# Patient Record
Sex: FEMALE | Race: AMERICAN INDIAN OR ALASKA NATIVE
[De-identification: names, ages, dates, MRNs, and addresses within clinical notes are randomized per-mention and may not be internally consistent; named-entity substitution may affect disease eponyms.]

---

## 2018-12-27 ENCOUNTER — HOSPITAL ENCOUNTER (EMERGENCY)
Dept: HOSPITAL 5 - ED | Age: 33
Discharge: HOME | End: 2018-12-27
Payer: COMMERCIAL

## 2018-12-27 VITALS — SYSTOLIC BLOOD PRESSURE: 148 MMHG | DIASTOLIC BLOOD PRESSURE: 90 MMHG

## 2018-12-27 DIAGNOSIS — R42: Primary | ICD-10-CM

## 2018-12-27 DIAGNOSIS — R51: ICD-10-CM

## 2018-12-27 LAB
BASOPHILS # (AUTO): 0.1 K/MM3 (ref 0–0.1)
BASOPHILS NFR BLD AUTO: 1.1 % (ref 0–1.8)
BILIRUB UR QL STRIP: (no result)
BLOOD UR QL VISUAL: (no result)
BUN SERPL-MCNC: 6 MG/DL (ref 7–17)
BUN/CREAT SERPL: 9 %
CALCIUM SERPL-MCNC: 9 MG/DL (ref 8.4–10.2)
EOSINOPHIL # BLD AUTO: 0 K/MM3 (ref 0–0.4)
EOSINOPHIL NFR BLD AUTO: 0.4 % (ref 0–4.3)
HCT VFR BLD CALC: 40.4 % (ref 30.3–42.9)
HEMOLYSIS INDEX: 8
HGB BLD-MCNC: 13.3 GM/DL (ref 10.1–14.3)
LYMPHOCYTES # BLD AUTO: 1.6 K/MM3 (ref 1.2–5.4)
LYMPHOCYTES NFR BLD AUTO: 22 % (ref 13.4–35)
MCH RBC QN AUTO: 30 PG (ref 28–32)
MCHC RBC AUTO-ENTMCNC: 33 % (ref 30–34)
MCV RBC AUTO: 90 FL (ref 79–97)
MONOCYTES # (AUTO): 0.5 K/MM3 (ref 0–0.8)
MONOCYTES % (AUTO): 7.3 % (ref 0–7.3)
PH UR STRIP: 7 [PH] (ref 5–7)
PLATELET # BLD: 446 K/MM3 (ref 140–440)
PROT UR STRIP-MCNC: (no result) MG/DL
RBC # BLD AUTO: 4.47 M/MM3 (ref 3.65–5.03)
RBC #/AREA URNS HPF: < 1 /HPF (ref 0–6)
UROBILINOGEN UR-MCNC: < 2 MG/DL (ref ?–2)
WBC #/AREA URNS HPF: 1 /HPF (ref 0–6)

## 2018-12-27 PROCEDURE — 84703 CHORIONIC GONADOTROPIN ASSAY: CPT

## 2018-12-27 PROCEDURE — 96360 HYDRATION IV INFUSION INIT: CPT

## 2018-12-27 PROCEDURE — 99284 EMERGENCY DEPT VISIT MOD MDM: CPT

## 2018-12-27 PROCEDURE — 80048 BASIC METABOLIC PNL TOTAL CA: CPT

## 2018-12-27 PROCEDURE — 36415 COLL VENOUS BLD VENIPUNCTURE: CPT

## 2018-12-27 PROCEDURE — 85025 COMPLETE CBC W/AUTO DIFF WBC: CPT

## 2018-12-27 PROCEDURE — 81001 URINALYSIS AUTO W/SCOPE: CPT

## 2018-12-27 PROCEDURE — 70450 CT HEAD/BRAIN W/O DYE: CPT

## 2018-12-27 NOTE — EMERGENCY DEPARTMENT REPORT
ED Dizziness HPI





- General


Chief Complaint: Dizziness


Stated Complaint: DIZZY


Time Seen by Provider: 12/27/18 12:49


Source: patient


Mode of arrival: Ambulatory


Limitations: No Limitations





- History of Present Illness


Initial Comments: 





This is a 33-year-old female nontoxic, well nourished in appearance, no acute 

signs of distress presents to the ED with c/o of dizziness, bilateral ear 

pressure, and acute headache x1 month.  Patient describes headache as diffuse 

with level of 8 out of 10.  Patient denies thunderclap headache.  Patient denies

any radiation of pain.  Patient denies any head trauma.  Patient denies any 

visual changes.  Patient stated that this is the worse headache.  Patient stated

that darkness makes headache better and bright lights make the headache worse. 

Patient denies any syncope. Patient denies any numbness, tingling, fever, 

chills, nausea, vomiting, chest pain, shortness of breath, stiff neck.  Patient 

denies any radiation of pain.  Patient denies any allergies or PMH.


MD Complaint: dizziness, lightheadedness, other (headache)


-: month(s) (1)


Timing: gradual onset


Description: lightheadedness


History of Same: Yes


History of Trauma: No


Severity: mild


Improves With: other (darkness)


Worsens With: other (bright lights)


Associated Symptoms: denies other symptoms.  denies: ataxia, chest pain, 

confusion, cough, diaphoresis, fever/chills, loss of appetite, malaise, rash, 

seizure, shortness of breath, syncope, weakness





- Related Data


                                  Previous Rx's











 Medication  Instructions  Recorded  Last Taken  Type


 


HYDROcodone/APAP 5-325 [[place] 1 each PO Q6HR PRN #14 tablet 09/12/14 Unknown Rx





5/325]    


 


Butalb/Acetamin/Caff -40 1 tab PO Q6HR PRN #12 tab 12/27/18 Unknown Rx





[Fioricet]    











                                    Allergies











Allergy/AdvReac Type Severity Reaction Status Date / Time


 


No Known Allergies Allergy   Verified 01/01/14 04:07














ED Review of Systems


ROS: 


Stated complaint: DIZZY


Other details as noted in HPI





Constitutional: denies: chills, fever


Eyes: denies: eye pain, eye discharge, vision change


ENT: ear pain.  denies: throat pain


Respiratory: denies: cough, shortness of breath, wheezing


Cardiovascular: denies: chest pain, palpitations


Endocrine: no symptoms reported


Gastrointestinal: denies: abdominal pain, nausea, diarrhea


Genitourinary: denies: urgency, dysuria, discharge


Musculoskeletal: denies: back pain, joint swelling, arthralgia


Skin: denies: rash, lesions


Neurological: headache, other (dizziness).  denies: weakness, paresthesias


Psychiatric: denies: anxiety, depression


Hematological/Lymphatic: denies: easy bleeding, easy bruising





ED Past Medical Hx





- Past Medical History


Previous Medical History?: No





- Surgical History


Past Surgical History?: No





- Social History


Smoking Status: Never Smoker


Substance Use Type: None





- Medications


Home Medications: 


                                Home Medications











 Medication  Instructions  Recorded  Confirmed  Last Taken  Type


 


HYDROcodone/APAP 5-325 [[place] 1 each PO Q6HR PRN #14 tablet 09/12/14  Unknown Rx





5/325]     


 


Butalb/Acetamin/Caff -40 1 tab PO Q6HR PRN #12 tab 12/27/18  Unknown Rx





[Fioricet]     














ED Physical Exam





- General


Limitations: No Limitations


General appearance: alert, in no apparent distress





- Head


Head exam: Present: atraumatic, normocephalic





- Eye


Eye exam: Present: normal appearance, PERRL, EOMI


Pupils: Present: normal accommodation





- ENT


ENT exam: Present: normal exam, normal orophraynx, TM's normal bilaterally, 

normal external ear exam





- Neck


Neck exam: Present: normal inspection, full ROM.  Absent: tenderness, 

meningismus, lymphadenopathy





- Respiratory


Respiratory exam: Present: normal lung sounds bilaterally.  Absent: respiratory 

distress, wheezes, rales, rhonchi, stridor, chest wall tenderness, accessory 

muscle use, decreased breath sounds, prolonged expiratory





- Cardiovascular


Cardiovascular Exam: Present: regular rate, normal rhythm, normal heart sounds. 

 Absent: bradycardia, tachycardia, irregular rhythm, systolic murmur, diastolic 

murmur, rubs, gallop





- Extremities Exam


Extremities exam: Present: normal inspection, full ROM





- Back Exam


Back exam: Present: normal inspection, full ROM.  Absent: tenderness, paraspinal

 tenderness, vertebral tenderness





- Neurological Exam


Neurological exam: Present: alert, oriented X3, normal gait





- Expanded Neurological Exam


  ** Expanded


Patient oriented to: Present: person, place, time


Cranial nerves: EOM's Intact: Normal, Facial Sensation: Normal


Cerebellar function: Finger to Nose: Normal


Upper motor neuron: Sensory Extinction: Normal


Sensory exam: Upper Extremity Light Touch: Normal, Upper Extremity Pin Prick: 

Normal, Upper Extremity Temperature: Normal, UE 2 Point Discrimination: Normal, 

Lower Extremity Light Touch: Normal, Lower Extremity Pin Prick: Normal, Lower E

xtremity Temperature: Normal, LE 2 Point Discrimination: Normal


Motor strength exam: RUE: 5, LUE: 5, RLE: 5, LLE: 5


Best Eye Response ([place]): (4) open spontaneously


Best Motor Response ([place]): (6) obeys commands


Best Verbal Response ([place]): (5) oriented


Vera Total: 15





- Psychiatric


Psychiatric exam: Present: normal affect, normal mood





- Skin


Skin exam: Present: warm, dry, intact, normal color.  Absent: rash





ED Course





                                   Vital Signs











  12/27/18





  11:45


 


Temperature 98.9 F


 


Pulse Rate 85


 


Respiratory 18





Rate 


 


Blood Pressure 148/90


 


O2 Sat by Pulse 99





Oximetry 














- Reevaluation(s)


Reevaluation #1: 





12/27/18 14:40


Patient is speaking in full sentences with no signs of distress noted.





ED Medical Decision Making





- Lab Data


Result diagrams: 


                                 12/27/18 13:05





                                 12/27/18 13:05





- Medical Decision Making





This is a 33-year-old female that presents with headache and dizziness.  Patient

 is stable and was examined by me.  Patient is neurologically stable.  There is 

no stiff neck or neck pain.  Vital signs are stable.  Patient is afebrile.  Labs

 unremarkable.  CT of head obtained and dictated by the radiologist 

unremarkable.  Patient is notified of the CT results with no questions noted by 

the patient.  Patient received 1 L of normal saline which the patient stated 

that headache and dizziness has subsided and resolved.  Orthostatic vitals 

unremarkable.  Patient is discharged with Fioricet.  Patient was referred to 

Follow-up with a primary care/neurologist doctor in 3-5 days or if symptoms 

worsen and continue return to emergency room as soon as possible.  At time of 

discharge, the patient does not seem toxic or ill in appearance.  No acute signs

 of distress noted.  Patient agrees to discharge treatment plan of care.  No 

further questions noted by the patient.


Critical care attestation.: 


If time is entered above; I have spent that time in minutes in the direct care 

of this critically ill patient, excluding procedure time.








ED Disposition


Clinical Impression: 


 Dizziness





Headache


Qualifiers:


 Headache type: unspecified Headache chronicity pattern: acute headache 

Intractability: not intractable Qualified Code(s): R51 - Headache





Disposition: DC-01 TO HOME OR SELFCARE


Is pt being admited?: No


Does the pt Need Aspirin: No


Condition: Stable


Instructions:  Dizziness (ED), Acute Headache (ED)


Additional Instructions: 


Follow-up with a primary care/neurologist doctor in 3-5 days or if symptoms 

worsen and continue return to emergency room as soon as possible. 


Prescriptions: 


Butalb/Acetamin/Caff -40 [Fioricet] 1 tab PO Q6HR PRN #12 tab


 PRN Reason: Headache


Referrals: 


PRIMARY CARE,MD [Primary Care Provider] - 3-5 Days


SHANT CHAVEZ MD [Staff Physician] - 3-5 Days


POLA VANESSA MD [Staff Physician] - 3-5 Days


[facility] [Outside] - 3-5 Days


[facility] [Outside] - 3-5 Days


Forms:  Work/School Release Form(ED)

## 2018-12-27 NOTE — CAT SCAN REPORT
CT HEAD WITHOUT CONTRAST:



HISTORY:  Headache.



TECHNIQUE:  Sequential 2.5mm CT images.



COMPARISON: none.



FINDINGS:



Cerebral Parenchyma: Within normal limits.



Cerebellum:  Within normal limits.



Brainstem:  Within normal limits.



Ventricles: Normal.



Sella:  Normal.



Extra-axial spaces:  Normal.



Basal Cisterns:  Normal.



Intracranial Hemorrhage:  None.



Midline Shift:  None.



Calvarium: Normal.



Sinuses: Normal.



Mastoid Air Cells:  Normal.



Visualized Orbits:  Normal.







IMPRESSION:

Cranial CT scan within normal limits.

## 2020-02-26 ENCOUNTER — HOSPITAL ENCOUNTER (OUTPATIENT)
Dept: HOSPITAL 5 - ED | Age: 35
Setting detail: OBSERVATION
LOS: 1 days | Discharge: HOME | End: 2020-02-27
Attending: OBSTETRICS & GYNECOLOGY | Admitting: OBSTETRICS & GYNECOLOGY
Payer: COMMERCIAL

## 2020-02-26 DIAGNOSIS — R10.30: ICD-10-CM

## 2020-02-26 DIAGNOSIS — K52.9: Primary | ICD-10-CM

## 2020-02-26 DIAGNOSIS — N73.9: ICD-10-CM

## 2020-02-26 LAB
ALBUMIN SERPL-MCNC: 3.8 G/DL (ref 3.9–5)
ALT SERPL-CCNC: 21 UNITS/L (ref 7–56)
BASOPHILS # (AUTO): 0 K/MM3 (ref 0–0.1)
BASOPHILS NFR BLD AUTO: 0.5 % (ref 0–1.8)
BENZODIAZEPINES SCREEN,URINE: (no result)
BILIRUB UR QL STRIP: (no result)
BLOOD UR QL VISUAL: (no result)
BUN SERPL-MCNC: 10 MG/DL (ref 7–17)
BUN/CREAT SERPL: 17 %
CALCIUM SERPL-MCNC: 8.8 MG/DL (ref 8.4–10.2)
EOSINOPHIL # BLD AUTO: 0 K/MM3 (ref 0–0.4)
EOSINOPHIL NFR BLD AUTO: 0.5 % (ref 0–4.3)
HCT VFR BLD CALC: 39 % (ref 30.3–42.9)
HEMOLYSIS INDEX: 6
HGB BLD-MCNC: 13 GM/DL (ref 10.1–14.3)
LYMPHOCYTES # BLD AUTO: 1.3 K/MM3 (ref 1.2–5.4)
LYMPHOCYTES NFR BLD AUTO: 27 % (ref 13.4–35)
MCHC RBC AUTO-ENTMCNC: 33 % (ref 30–34)
MCV RBC AUTO: 90 FL (ref 79–97)
METHADONE SCREEN,URINE: (no result)
MONOCYTES # (AUTO): 0.4 K/MM3 (ref 0–0.8)
MONOCYTES % (AUTO): 7.6 % (ref 0–7.3)
MUCOUS THREADS #/AREA URNS HPF: (no result) /HPF
OPIATE SCREEN,URINE: (no result)
PH UR STRIP: 8 [PH] (ref 5–7)
PLATELET # BLD: 326 K/MM3 (ref 140–440)
PROT UR STRIP-MCNC: (no result) MG/DL
RBC # BLD AUTO: 4.35 M/MM3 (ref 3.65–5.03)
RBC #/AREA URNS HPF: 3 /HPF (ref 0–6)
UROBILINOGEN UR-MCNC: < 2 MG/DL (ref ?–2)
WBC #/AREA URNS HPF: 1 /HPF (ref 0–6)

## 2020-02-26 PROCEDURE — 82550 ASSAY OF CK (CPK): CPT

## 2020-02-26 PROCEDURE — 87210 SMEAR WET MOUNT SALINE/INK: CPT

## 2020-02-26 PROCEDURE — 81001 URINALYSIS AUTO W/SCOPE: CPT

## 2020-02-26 PROCEDURE — 74177 CT ABD & PELVIS W/CONTRAST: CPT

## 2020-02-26 PROCEDURE — 82140 ASSAY OF AMMONIA: CPT

## 2020-02-26 PROCEDURE — 76830 TRANSVAGINAL US NON-OB: CPT

## 2020-02-26 PROCEDURE — 87040 BLOOD CULTURE FOR BACTERIA: CPT

## 2020-02-26 PROCEDURE — 93975 VASCULAR STUDY: CPT

## 2020-02-26 PROCEDURE — 93010 ELECTROCARDIOGRAM REPORT: CPT

## 2020-02-26 PROCEDURE — 87591 N.GONORRHOEAE DNA AMP PROB: CPT

## 2020-02-26 PROCEDURE — 96375 TX/PRO/DX INJ NEW DRUG ADDON: CPT

## 2020-02-26 PROCEDURE — 80307 DRUG TEST PRSMV CHEM ANLYZR: CPT

## 2020-02-26 PROCEDURE — 83735 ASSAY OF MAGNESIUM: CPT

## 2020-02-26 PROCEDURE — 80053 COMPREHEN METABOLIC PANEL: CPT

## 2020-02-26 PROCEDURE — 85025 COMPLETE CBC W/AUTO DIFF WBC: CPT

## 2020-02-26 PROCEDURE — 83690 ASSAY OF LIPASE: CPT

## 2020-02-26 PROCEDURE — 84703 CHORIONIC GONADOTROPIN ASSAY: CPT

## 2020-02-26 PROCEDURE — G0378 HOSPITAL OBSERVATION PER HR: HCPCS

## 2020-02-26 PROCEDURE — 99285 EMERGENCY DEPT VISIT HI MDM: CPT

## 2020-02-26 PROCEDURE — 36415 COLL VENOUS BLD VENIPUNCTURE: CPT

## 2020-02-26 PROCEDURE — 96365 THER/PROPH/DIAG IV INF INIT: CPT

## 2020-02-26 PROCEDURE — 96367 TX/PROPH/DG ADDL SEQ IV INF: CPT

## 2020-02-26 PROCEDURE — 93005 ELECTROCARDIOGRAM TRACING: CPT

## 2020-02-26 RX ADMIN — ACETAMINOPHEN AND CODEINE PHOSPHATE PRN TAB: 300; 30 TABLET ORAL at 20:47

## 2020-02-26 NOTE — CAT SCAN REPORT
CT ABDOMEN AND PELVIS WITH CONTRAST



HISTORY: abd pain n/v.



COMPARISON: Pelvic ultrasound from today



TECHNIQUE: CT images of the abdomen and pelvis were obtained following administration of intravenous 
contrast.  All CT scans at this location are performed using CT dose reduction for ALARA by means of 
automated exposure control. 



CONTRAST: 100 ml of intravenous contrast administered.



FINDINGS:



Lungs/bones:  There is minimal groundglass nodular density in the right lung base as seen on image #1
6 of series #2. No acute osseous abnormality or significant degenerative change within the spine.



Abdomen/pelvis:  The liver, gallbladder, spleen, pancreas, adrenals, kidneys, and proximal GI tract a
ppear unremarkable.



No pathologic peritoneal or retroperitoneal adenopathy.



Urinary bladder is unremarkable. No acute colonic abnormality identified. The terminal ileum is jeri
l. I do not identify the appendix; however, there is no pericecal inflammatory change.



The uterus is quite heterogeneous in attenuation and there are surrounding fluid attenuation structur
es as well as intermediate attenuation trace fluid within the pelvis which tracks up the right cul-de
-sac. There is also a tubular-appearing structure which is fluid-filled in the right adnexal region. 
This latter finding can be seen with blood products or simply proteinaceous products in general.



IMPRESSION:

1. Abnormal appearance of the reproductive organs as outlined above could be seen in the setting of p
elvic inflammatory disease possibly with hydrosalpinx involving the right fallopian tube since there 
is a tubular fluid attenuation structure. Superimposed small volume free fluid in the abdomen and pel
vis is also noted and could be seen with infectious/inflammatory process or recent hemorrhagic cyst r
upture. The latter finding is considered less likely since the ovaries otherwise appear unremarkable.




Signer Name: Delmer Villanueva MD 

Signed: 2/26/2020 2:37 PM

 Workstation Name: [id number]

## 2020-02-26 NOTE — ULTRASOUND REPORT
TRANSABDOMINAL PELVIC AND TRANSVAGINAL ULTRASOUND



HISTORY: lower abd pain. Negative urine pregnancy test.



COMPARISON: None.



TECHNIQUE: Routine transabdominal and transvaginal pelvic ultrasound performed.



FINDINGS:

TRANSABDOMINAL PELVIC ULTRASOUND: 

Uterus: Suboptimally imaged due to inadequate distention of the urinary bladder.

Endometrium: Appears thickened.

Right Ovary:  Normal size, blood flow and appearance measuring it measures 4.2 x 1.8 x 4.9 cm.

Left Ovary: Not well imaged.



Additional findings: Transvaginal exam was performed for better delineation of the endometrium and ov
kailyn.



TRANSVAGINAL PELVIC ULTRASOUND:

Uterus: At least 1 posterior intramural uterine fibroid in the uterine body measures 3.4 x 3.3 x 3.1 
cm.  The main portion of the uterus measures 10.1 x 4.6 x 5.0 cm. There is a questionable uterine fib
roid projecting posteriorly into the cul-de-sac and measuring 6.1 x 3.4 x 5.0 cm. This is labeled as 
an ovary but does not correlate with the structure that is better imaged and measured as the ovary on
 the transabdominal exam.

Endometrium: Thickened and diffusely echogenic measuring  24.7 mm.

Right Ovary:  Not confidently imaged.  

Left Ovary: Not confidently imaged.



Additional findings: Mild free pelvic fluid with diffuse internal echoes within the fluid.



IMPRESSION:

1. Uterine leiomyomata and suboptimal imaging of the uterus on both transabdominal and transvaginal i
maging.

2. Normal secretory endometrium.

3.Normal right ovary. 

4. A left ovary is not confidently identified.

5. Mild echogenic pelvic fluid is of uncertain etiology but hemoperitoneum is a consideration.



Signer Name: Remigio Cerda MD 

Signed: 2/26/2020 1:53 PM

 Workstation Name: [id number]

## 2020-02-26 NOTE — EMERGENCY DEPARTMENT REPORT
ED Abdominal Pain HPI





- General


Chief Complaint: Abdominal Pain


Stated Complaint: ABD PAIN


Time Seen by Provider: 20 10:13


Source: patient, RN notes reviewed


Mode of arrival: Ambulatory


Limitations: No Limitations





- History of Present Illness


Initial Comments: 





During the history and physical examination, I am chaperone and escorted by 

nurse Shreya Richard





The patient is a 34-year-old female who is not known to myself previously.  She 

reports no chronic medical conditions and no history of abdominal surgeries.  

She presents to the ER with half day to 1 day of lower abdominal pain, pulling 

feeling, discomfort.  There is no complaint of headache, neck pain, chest pain, 

irritative or obstructive urinary symptoms, she denies diarrhea, she denies v

aginal discharge, and she denies extremity weakness and numbness.  Her pain is 

basically constant, increases with palpation, range of motion, and it decreases 

with rest and pain medication.  She reports 1 sexual partner in the past couple 

months.


MD Complaint: abdominal pain


-: Gradual


Location: LLQ, RLQ, suprapubic


Migration to: other


Quality: other


Consistency: other


Improves With: other


Worsens With: other





- Related Data


                                  Previous Rx's











 Medication  Instructions  Recorded  Last Taken  Type


 


HYDROcodone/APAP 5-325 [[place] 1 each PO Q6HR PRN #14 tablet 14 Unknown Rx





5/325]    


 


Butalb/Acetamin/Caff -40 1 tab PO Q6HR PRN #12 tab 18 Unknown Rx





[Fioricet]    











                                    Allergies











Allergy/AdvReac Type Severity Reaction Status Date / Time


 


No Known Allergies Allergy   Verified 14 04:07














ED Review of Systems


ROS: 


Stated complaint: ABD PAIN


Other details as noted in HPI





Constitutional: malaise


Eyes: denies: eye discharge


ENT: denies: congestion


Respiratory: denies: wheezing


Cardiovascular: denies: syncope


Gastrointestinal: abdominal pain


Genitourinary: denies: dysuria


Musculoskeletal: denies: back pain


Neurological: weakness


Psychiatric: anxiety


Hematological/Lymphatic: denies: easy bleeding





ED Past Medical Hx





- Past Medical History


Previous Medical History?: No





- Surgical History


Past Surgical History?: No





- Social History


Smoking Status: Never Smoker


Substance Use Type: None





- Medications


Home Medications: 


                                Home Medications











 Medication  Instructions  Recorded  Confirmed  Last Taken  Type


 


HYDROcodone/APAP 5-325 [[place] 1 each PO Q6HR PRN #14 tablet 14  Unknown Rx





5/325]     


 


Butalb/Acetamin/Caff -40 1 tab PO Q6HR PRN #12 tab 18  Unknown Rx





[Fioricet]     














ED Physical Exam





- General


Limitations: No Limitations


General appearance: alert, anxious, in distress, obese





- Head


Head exam: Present: atraumatic, normocephalic





- Eye


Eye exam: Present: normal appearance, EOMI.  Absent: nystagmus





- ENT


ENT exam: Present: normal exam, normal orophraynx, mucous membranes moist, 

normal external ear exam





- Neck


Neck exam: Present: normal inspection.  Absent: tenderness, meningismus





- Respiratory


Respiratory exam: Present: normal lung sounds bilaterally.  Absent: respiratory 

distress





- Cardiovascular


Cardiovascular Exam: Present: regular rate, normal rhythm, normal heart sounds. 

 Absent: bradycardia, tachycardia, irregular rhythm, systolic murmur, diastolic 

murmur, rubs, gallop





- GI/Abdominal


GI/Abdominal exam: Present: soft, tenderness (There is mild diffuse lower abdom

inal tenderness, without rebound, guarding or peritoneal signs).  Absent: 

distended, guarding, rebound, rigid, pulsatile mass





- 


External exam: Present: normal external exam.  Absent: swelling, bleeding


Speculum exam: Present: vaginal discharge, cervical discharge


Bi-manual exam: Present: cervical motion tendernes, adnexal tenderness, other 

(Chaperoned by nurse Shreya Richard)





- Extremities Exam


Extremities exam: Present: normal inspection, full ROM, other (2+ pulses noted 

in the bilateral upper and lower extremities.  There is no palpable cord.   

negative Homans sign.  Muscular compartments are soft.  The pelvis is stable.). 

 Absent: pedal edema, calf tenderness





- Back Exam


Back exam: Present: normal inspection





- Neurological Exam


Neurological exam: Present: alert, other (There is no facial droop.  The tongue 

is midline.  Extraocular movements are intact bilaterally.  There is 5 out of 5 

strength in bilateral upper and lower extremities.  Sensation is intact to light

 touch bilateral upper and lower extremities. ).  Absent: motor sensory deficit





- Psychiatric


Psychiatric exam: Present: anxious





- Skin


Skin exam: Present: warm, dry, intact, normal color.  Absent: rash





ED Course


                                   Vital Signs











  20





  08:02 12:28


 


Temperature 98.1 F 


 


Pulse Rate 87 


 


Respiratory 24 17





Rate  


 


Blood Pressure 125/74 





[Right]  


 


O2 Sat by Pulse 99 98





Oximetry  














- Reevaluation(s)


Reevaluation #1: 





20 12:39


Differential diagnosis, including but not limited to: Colitis, diverticulitis, 

urinary tract infection, appendicitis, perforated viscus, pelvic inflammatory 

disease, enteritis





Assessment and plan: 34-year-old female who appears to be quite uncomfortable, 

with lower abdominal pain, tenderness, no report of nausea to myself, diarrhea, 

with cervical motion tenderness and adnexal tenderness.  She is afebrile with 

reassuring vital signs.  We will treat her symptoms.  CT scan abdomen pelvis, 

and pelvic ultrasound are pending.  Based off of the pelvic examination, have a 

moderate to high suspicion for pelvic inflammatory disease.  If objective 

imaging does not demonstrate alternative diagnosis, patient will be treated 

empirically for presumed pelvic inflammatory disease.


Reevaluation #2: 





20 15:02


d/w Dr JUAN Hampton, states he can follow in consultation is necessary; will defer to 

gyn to pursue medical consult should they feel it necessary








d/w General surgery, Dr ROB King, who can follow in consultation








case presented to Dr Dowd  of gyn for further management


Reevaluation #3: 





20 15:10


discusss plan of care with patient, shes amenable to plan of care





- Consultations


Consultation #1: 





20 14:45





d/w gyn Dr Dowd; advises admission to medical service








states she can follow in consultation














ED Medical Decision Making





- Lab Data


Result diagrams: 


                                 20 08:18





                                 20 08:18








                                   Vital Signs











  20





  08:02 12:28


 


Temperature 98.1 F 


 


Pulse Rate 87 


 


Respiratory 24 17





Rate  


 


Blood Pressure 125/74 





[Right]  


 


O2 Sat by Pulse 99 98





Oximetry  











                                   Lab Results











  20 Range/Units





  08:18 08:18 08:18 


 


WBC  4.8    (4.5-11.0)  K/mm3


 


RBC  4.35    (3.65-5.03)  M/mm3


 


Hgb  13.0    (10.1-14.3)  gm/dl


 


Hct  39.0    (30.3-42.9)  %


 


MCV  90    (79-97)  fl


 


MCH  30    (28-32)  pg


 


MCHC  33    (30-34)  %


 


RDW  13.9    (13.2-15.2)  %


 


Plt Count  326    (140-440)  K/mm3


 


Lymph % (Auto)  27.0    (13.4-35.0)  %


 


Mono % (Auto)  7.6 H    (0.0-7.3)  %


 


Eos % (Auto)  0.5    (0.0-4.3)  %


 


Baso % (Auto)  0.5    (0.0-1.8)  %


 


Lymph #  1.3    (1.2-5.4)  K/mm3


 


Mono #  0.4    (0.0-0.8)  K/mm3


 


Eos #  0.0    (0.0-0.4)  K/mm3


 


Baso #  0.0    (0.0-0.1)  K/mm3


 


Seg Neutrophils %  64.4    (40.0-70.0)  %


 


Seg Neutrophils #  3.1    (1.8-7.7)  K/mm3


 


Sodium   136 L   (137-145)  mmol/L


 


Potassium   3.6   (3.6-5.0)  mmol/L


 


Chloride   102.6   ()  mmol/L


 


Carbon Dioxide   19 L   (22-30)  mmol/L


 


Anion Gap   18   mmol/L


 


BUN   10   (7-17)  mg/dL


 


Creatinine   0.6 L   (0.7-1.2)  mg/dL


 


Estimated GFR   > 60   ml/min


 


BUN/Creatinine Ratio   17   %


 


Glucose   140 H   ()  mg/dL


 


Calcium   8.8   (8.4-10.2)  mg/dL


 


Magnesium     (1.7-2.3)  mg/dL


 


Total Bilirubin   0.30   (0.1-1.2)  mg/dL


 


AST   33   (5-40)  units/L


 


ALT   21   (7-56)  units/L


 


Alkaline Phosphatase   77   ()  units/L


 


Total Creatine Kinase     ()  units/L


 


Total Protein   7.7   (6.3-8.2)  g/dL


 


Albumin   3.8 L   (3.9-5)  g/dL


 


Albumin/Globulin Ratio   1.0   %


 


Lipase     (13-60)  units/L


 


HCG, Qual    Negative  (Negative)  


 


Urine Color     (Yellow)  


 


Urine Turbidity     (Clear)  


 


Urine pH     (5.0-7.0)  


 


Ur Specific Gravity     (1.003-1.030)  


 


Urine Protein     (Negative)  mg/dL


 


Urine Glucose (UA)     (Negative)  mg/dL


 


Urine Ketones     (Negative)  mg/dL


 


Urine Blood     (Negative)  


 


Urine Nitrite     (Negative)  


 


Urine Bilirubin     (Negative)  


 


Urine Urobilinogen     (<2.0)  mg/dL


 


Ur Leukocyte Esterase     (Negative)  


 


Urine WBC (Auto)     (0.0-6.0)  /HPF


 


Urine RBC (Auto)     (0.0-6.0)  /HPF


 


U Epithel Cells (Auto)     (0-13.0)  /HPF


 


Urine Mucus     /HPF


 


Urine Opiates Screen     


 


Urine Methadone Screen     


 


Ur Barbiturates Screen     


 


Ur Phencyclidine Scrn     


 


Ur Amphetamines Screen     


 


U Benzodiazepines Scrn     


 


Urine Cocaine Screen     


 


U Marijuana (THC) Screen     


 


Drugs of Abuse Note     














  20 Range/Units





  08:18 Unknown Unknown 


 


WBC     (4.5-11.0)  K/mm3


 


RBC     (3.65-5.03)  M/mm3


 


Hgb     (10.1-14.3)  gm/dl


 


Hct     (30.3-42.9)  %


 


MCV     (79-97)  fl


 


MCH     (28-32)  pg


 


MCHC     (30-34)  %


 


RDW     (13.2-15.2)  %


 


Plt Count     (140-440)  K/mm3


 


Lymph % (Auto)     (13.4-35.0)  %


 


Mono % (Auto)     (0.0-7.3)  %


 


Eos % (Auto)     (0.0-4.3)  %


 


Baso % (Auto)     (0.0-1.8)  %


 


Lymph #     (1.2-5.4)  K/mm3


 


Mono #     (0.0-0.8)  K/mm3


 


Eos #     (0.0-0.4)  K/mm3


 


Baso #     (0.0-0.1)  K/mm3


 


Seg Neutrophils %     (40.0-70.0)  %


 


Seg Neutrophils #     (1.8-7.7)  K/mm3


 


Sodium     (137-145)  mmol/L


 


Potassium     (3.6-5.0)  mmol/L


 


Chloride     ()  mmol/L


 


Carbon Dioxide     (22-30)  mmol/L


 


Anion Gap     mmol/L


 


BUN     (7-17)  mg/dL


 


Creatinine     (0.7-1.2)  mg/dL


 


Estimated GFR     ml/min


 


BUN/Creatinine Ratio     %


 


Glucose     ()  mg/dL


 


Calcium     (8.4-10.2)  mg/dL


 


Magnesium  1.80    (1.7-2.3)  mg/dL


 


Total Bilirubin     (0.1-1.2)  mg/dL


 


AST     (5-40)  units/L


 


ALT     (7-56)  units/L


 


Alkaline Phosphatase     ()  units/L


 


Total Creatine Kinase  1043 H    ()  units/L


 


Total Protein     (6.3-8.2)  g/dL


 


Albumin     (3.9-5)  g/dL


 


Albumin/Globulin Ratio     %


 


Lipase  20    (13-60)  units/L


 


HCG, Qual     (Negative)  


 


Urine Color   Yellow   (Yellow)  


 


Urine Turbidity   Slightly-cloudy   (Clear)  


 


Urine pH   8.0 H   (5.0-7.0)  


 


Ur Specific Gravity   1.021   (1.003-1.030)  


 


Urine Protein   <15 mg/dl   (Negative)  mg/dL


 


Urine Glucose (UA)   Neg   (Negative)  mg/dL


 


Urine Ketones   Neg   (Negative)  mg/dL


 


Urine Blood   Neg   (Negative)  


 


Urine Nitrite   Neg   (Negative)  


 


Urine Bilirubin   Neg   (Negative)  


 


Urine Urobilinogen   < 2.0   (<2.0)  mg/dL


 


Ur Leukocyte Esterase   Neg   (Negative)  


 


Urine WBC (Auto)   1.0   (0.0-6.0)  /HPF


 


Urine RBC (Auto)   3.0   (0.0-6.0)  /HPF


 


U Epithel Cells (Auto)   5.0   (0-13.0)  /HPF


 


Urine Mucus   Few   /HPF


 


Urine Opiates Screen    Presumptive negative  


 


Urine Methadone Screen    Presumptive negative  


 


Ur Barbiturates Screen    Presumptive negative  


 


Ur Phencyclidine Scrn    Presumptive negative  


 


Ur Amphetamines Screen    Presumptive negative  


 


U Benzodiazepines Scrn    Presumptive negative  


 


Urine Cocaine Screen    Presumptive negative  


 


U Marijuana (THC) Screen    Presumptive negative  


 


Drugs of Abuse Note    Disclamer  














- EKG Data


-: EKG Interpreted by Me


EKG shows normal: sinus rhythm


Rate: normal





- EKG Data


When compared to previous EKG there are: previous EKG unavailable





20 12:40


There is no prior EKG available for comparison.  Sinus rhythm, 73 bpm, normal 

axis, the QTC is 451 ms, there is high left ventricular voltage, motion michael

fact.  No prior for comparison, not a STEMI





- Radiology Data


Radiology results: pending, report reviewed, image reviewed





Print Report











Referring Physician: MARIA RUFFIN 


 


Patient Name: TEMI BRIGHT 


 


Patient ID: [id number] 


 


YOB: 1985 


 


Sex: Female 


 


Accession: [id number] 


 


Report Date: 2020 


 


Report Status: Finalized 


 


Findings


[facility] [address] [place] [address] 

Ultrasound Report Signed Patient: TEMI BRIGHT MR#: [id number] 1 : 1985

 Acct:[id number] Age/Sex: 34 / F ADM Date: 20 Loc: ED Attending Dr: 

Ordering Physician: MARIA RUFFIN MD Date of Service: 20 Procedure(s):

 US pelvis duplex doppler comp Accession Number(s): [id number] cc: MARIA RUFFIN MD TRANSABDOMINAL PELVIC AND TRANSVAGINAL ULTRASOUND HISTORY: lower 

abd pain. Negative urine pregnancy test. COMPARISON: None. TECHNIQUE: Routine 

transabdominal and transvaginal pelvic ultrasound performed. FINDINGS: T

RANSABDOMINAL PELVIC ULTRASOUND: Uterus: Suboptimally imaged due to inadequate 

distention of the urinary bladder. Endometrium: Appears thickened. Right Ovary: 

Normal size, blood flow and appearance measuring it measures 4.2 x 1.8 x 4.9 cm.

 Left Ovary: Not well imaged. Additional findings: Transvaginal exam was 

performed for better delineation of the endometrium and ovaries. TRANSVAGINAL 

PELVIC ULTRASOUND: Uterus: At least 1 posterior intramural uterine fibroid in 

the uterine body measures 3.4 x 3.3 x 3.1 cm. The main portion of the uterus 

measures 10.1 x 4.6 x 5.0 cm. There is a questionable uterine fibroid projecting

 posteriorly into the cul-de-sac and measuring 6.1 x 3.4 x 5.0 cm. This is 

labeled as an ovary but does not correlate with the structure that is better 

imaged and measured as the ovary on the transabdominal exam. Endometrium: 

Thickened and diffusely echogenic measuring 24.7 mm. Right Ovary: Not 

confidently imaged. Left Ovary: Not confidently imaged. Additional findings: Mi

ld free pelvic fluid with diffuse internal echoes within the fluid. 





IMPRESSION: 1. Uterine leiomyomata and suboptimal imaging of the uterus on both 

transabdominal and transvaginal imaging. 2. Normal secretory endometrium. 

3.Normal right ovary. 4. A left ovary is not confidently identified. 5. Mild 

echogenic pelvic fluid is of uncertain etiology but hemoperitoneum is a 

consideration. Signer Name: Shant Rodriges MD Signed: 2020 1:53 PM 

Workstation Name: [id number] Transcribed By: REF Dictated By: SHANT RODRIGES MD Electronically Authenticated By: SHANT RODRIGES MD Signed Date/Ti

me: 20 1353   

















Print Report











Referring Physician: MARIA RUFFIN 


 


Patient Name: TEMI BRIGHT 


 


Patient ID: [id number] 


 


YOB: 1985 


 


Sex: Female 


 


Accession: [id number] 


 


Report Date: 2020 


 


Report Status: Finalized 


 


Findings


[facility] [address] [place] [address] Cat

 Scan Report Signed Patient: TEMI BRIGHT MR#: [id number] 1 : 1985 

Acct:[id number] Age/Sex: 34 / F ADM Date: 20 Loc: ED Attending Dr: Order

ing Physician: MARIA RUFFIN MD Date of Service: 20 Procedure(s): CT 

abdomen pelvis w con Accession Number(s): [id number] cc: MARIA RUFFIN MD CT 

ABDOMEN AND PELVIS WITH CONTRAST HISTORY: abd pain n/v. COMPARISON: Pelvic 

ultrasound from today TECHNIQUE: CT images of the abdomen and pelvis were 

obtained following administration of intravenous contrast. All CT scans at this 

location are performed using CT dose reduction for ALARA by means of automated 

exposure control. CONTRAST: 100 ml of intravenous contrast administered. 

FINDINGS: Lungs/bones: There is minimal groundglass nodular density in the right

 lung base as seen on image #16 of series #2. No acute osseous abnormality or 

significant degenerative change within the spine. Abdomen/pelvis: The liver, 

gallbladder, spleen, pancreas, adrenals, kidneys, and proximal GI tract appear 

unremarkable. No pathologic peritoneal or retroperitoneal adenopathy. Urinary 

bladder is unremarkable. No acute colonic abnormality identified. The terminal 

ileum is normal. I do not identify the appendix; however, there is no pericecal 

inflammatory change. The uterus is quite heterogeneous in attenuation and there 

are surrounding fluid attenuation structures as well as intermediate attenuation

 trace fluid within the pelvis which tracks up the right cul-de-sac. There is 

also a tubular-appearing structure which is fluid-filled in the right adnexal 

region. This latter finding can be seen with blood products or simply 

proteinaceous products in general. 








IMPRESSION: 1. Abnormal appearance of the reproductive organs as outlined above 

could be seen in the setting of pelvic inflammatory disease possibly with 

hydrosalpinx involving the right fallopian tube since there is a tubular fluid 

attenuation structure. 








Superimposed small volume free fluid in the abdomen and pelvis is also noted and

 could be seen with infectious/inflammatory process or recent hemorrhagic cyst 

rupture. The latter finding is considered less likely since the ovaries 

otherwise appear unremarkable. Signer Name: Delmer Villanueva MD Signed: 2020

 2:37 PM Workstation Name: [id number] Transcribed By: RAKESH Dictated By: Delmer Villanueva MD Electronically Authenticated By: Delmer Villanueva MD Signed 

Date/Time: 20 1437 DD/DT: 20 1429   











Critical care attestation.: 


If time is entered above; I have spent that time in minutes in the direct care 

of this critically ill patient, excluding procedure time.








ED Disposition


Clinical Impression: 


 PID (acute pelvic inflammatory disease), Lower abdominal pain





Disposition: DC-09 OP ADMIT IP TO THIS HOSP


Is pt being admited?: Yes


Condition: Good


Instructions:  Abdominal Pain (ED)


Referrals: 


PRIMARY CARE,MD [Primary Care Provider] - 3-5 Days

## 2020-02-26 NOTE — ULTRASOUND REPORT
TRANSABDOMINAL PELVIC AND TRANSVAGINAL ULTRASOUND



HISTORY: lower abd pain. Negative urine pregnancy test.



COMPARISON: None.



TECHNIQUE: Routine transabdominal and transvaginal pelvic ultrasound performed.



FINDINGS:

TRANSABDOMINAL PELVIC ULTRASOUND: 

Uterus: Suboptimally imaged due to inadequate distention of the urinary bladder.

Endometrium: Appears thickened.

Right Ovary:  Normal size, blood flow and appearance measuring it measures 4.2 x 1.8 x 4.9 cm.

Left Ovary: Not well imaged.



Additional findings: Transvaginal exam was performed for better delineation of the endometrium and ov
kailyn.



TRANSVAGINAL PELVIC ULTRASOUND:

Uterus: At least 1 posterior intramural uterine fibroid in the uterine body measures 3.4 x 3.3 x 3.1 
cm.  The main portion of the uterus measures 10.1 x 4.6 x 5.0 cm. There is a questionable uterine fib
roid projecting posteriorly into the cul-de-sac and measuring 6.1 x 3.4 x 5.0 cm. This is labeled as 
an ovary but does not correlate with the structure that is better imaged and measured as the ovary on
 the transabdominal exam.

Endometrium: Thickened and diffusely echogenic measuring  24.7 mm.

Right Ovary:  Not confidently imaged.  

Left Ovary: Not confidently imaged.



Additional findings: Mild free pelvic fluid with diffuse internal echoes within the fluid.



IMPRESSION:

1. Uterine leiomyomata and suboptimal imaging of the uterus on both transabdominal and transvaginal i
maging.

2. Normal secretory endometrium.

3.Normal right ovary. 

4. A left ovary is not confidently identified.

5. Mild echogenic pelvic fluid is of uncertain etiology but hemoperitoneum is a consideration.



Signer Name: Remigio Cerda MD 

Signed: 2/26/2020 1:53 PM

 Workstation Name: [id number]

## 2020-02-26 NOTE — HISTORY AND PHYSICAL REPORT
History of Present Illness


Date of examination: 20


Date of admission: 


20 15:13





Chief complaint: 





abdominal pain


History of present illness: 








35yo  presented to ER complaining of diffuse abdominal pain.  She states 

this weekend she drank green tea, had 6 bowel movements then experienced severe 

abdominal pain.  She ate pasta antonietaredro from [employer], cereal with milk and rice 

within 24 hours of taking the tea and states the excruciating pain began after 

that time.  She denies any vaginal bleeding, nausea and vomiting.  Her last 

sexual intercourse was in December which she attributes to her  being in 

[place]. She denies any history of sexually transmitted diseases.





Her US reveals uterine fibroids to which she was not aware of.  Non-specific 

pelvic exam,  WBC 4 and low grade fever is not suggestive of pelvic inflammatory

disease therefore antibiotics were discontinued. 











Past History


Family/Genetic History: none


Social history: 





- Obstetrical History


: 2


Number of Living Children: 1





Medications and Allergies


                                    Allergies











Allergy/AdvReac Type Severity Reaction Status Date / Time


 


No Known Allergies Allergy   Verified 14 04:07











                                Home Medications











 Medication  Instructions  Recorded  Confirmed  Last Taken  Type


 


HYDROcodone/APAP 5-325 [[place] 1 each PO Q6HR PRN #14 tablet 14  Unknown Rx





5/325]     


 


Butalb/Acetamin/Caff -40 1 tab PO Q6HR PRN #12 tab 18  Unknown Rx





[Fioricet]     














Review of Systems


Gastrointestinal: abdominal pain, change in bowel habits





- Vital Signs


Vital signs: 


                                   Vital Signs











Temp Pulse Resp BP Pulse Ox


 


 98.1 F   87   24   125/74   99 


 


 20 08:02  20 08:02  20 08:02  20 08:02  20 08:02








                                        











Temp Pulse Resp BP Pulse Ox


 


 99.1 F   91 H  18   135/77   100 


 


 20 18:11  20 18:11  20 18:11  20 18:11  20 18:11














- Physical Exam


Abdomen: Positive: soft, tenderness, other (NO REBOUND, NO GUARDING. Obese)


Genitourinary (Female): Positive: normal external genitalia


Adnexa: both: normal


Extremities: Positive: normal





Results


Result Diagrams: 


                                 20 08:18





                                 20 08:18


                              Abnormal lab results











  20 Range/Units





  08:18 08:18 08:18 


 


Mono % (Auto)  7.6 H    (0.0-7.3)  %


 


Sodium   136 L   (137-145)  mmol/L


 


Carbon Dioxide   19 L   (22-30)  mmol/L


 


Creatinine   0.6 L   (0.7-1.2)  mg/dL


 


Glucose   140 H   ()  mg/dL


 


Total Creatine Kinase    1043 H  ()  units/L


 


Albumin   3.8 L   (3.9-5)  g/dL


 


Urine pH     (5.0-7.0)  














  20 Range/Units





  Unknown 


 


Mono % (Auto)   (0.0-7.3)  %


 


Sodium   (137-145)  mmol/L


 


Carbon Dioxide   (22-30)  mmol/L


 


Creatinine   (0.7-1.2)  mg/dL


 


Glucose   ()  mg/dL


 


Total Creatine Kinase   ()  units/L


 


Albumin   (3.9-5)  g/dL


 


Urine pH  8.0 H  (5.0-7.0)  








All other labs normal.








Assessment and Plan





- Patient Problems


(1) Gastroenteritis


Current Visit: Yes   Status: Acute   


Plan to address problem: 


Abdominal pain- 





Gastroenteritis -Based on history and clinical exam likely diagnosis. Recommend 

probiotics, non-dairy, non-lactose diet and cessation of herbal green tea.





Ruptured hemorrhagic cyst - less likely but any hemoperitoneum is self-limited 

and will be reabsorbed. Hgb 13, no signs of anemia.





Uterine fibroid - possible cause of abdominal pain but less likely in the acute 

setting of pain.





PID - unlikely based on clinical exam, labs and patient history. 





Pain control-





Toradol and Tylenol #3 ordered.  Plan to discharge home on PO pain meds.





Counseled patient on above findings and follow-up outpatient if worsening of 

symptoms.  Made recommendations on supportive care for gastroenteritis.

## 2020-02-27 VITALS — DIASTOLIC BLOOD PRESSURE: 69 MMHG | SYSTOLIC BLOOD PRESSURE: 123 MMHG

## 2020-02-27 RX ADMIN — ACETAMINOPHEN AND CODEINE PHOSPHATE PRN TAB: 300; 30 TABLET ORAL at 05:26

## 2020-02-27 RX ADMIN — ACETAMINOPHEN AND CODEINE PHOSPHATE PRN TAB: 300; 30 TABLET ORAL at 00:49

## 2020-02-27 RX ADMIN — ACETAMINOPHEN AND CODEINE PHOSPHATE PRN TAB: 300; 30 TABLET ORAL at 15:55

## 2020-02-27 NOTE — DISCHARGE SUMMARY
Providers





- Providers


Date of Admission: 


02/26/20 15:13





Date of discharge: 02/27/20


Attending physician: 


TESSY QUINONES





                                        





02/26/20 14:43


Consult to Physician [CONS] Urgent 


   Comment: DR AUGUSTIN LEES W/DR QUINONES @[id number]


   Consulting Provider: TESSY QUINONES


   Physician Instructions: 


   Reason For Exam: abd pain ? hemoperitoneum





02/26/20 14:49


Consult to Physician [CONS] Urgent 


   Comment: DR AUGUSTIN LEES W/DR RAMIREZ @[id number]


   Consulting Provider: HAROON RAMIREZ


   Physician Instructions: 


   Reason For Exam: abd pain











Primary care physician: 


PRIMARY CARE MD








Hospitalization


Condition at discharge: Stable


Disposition: DC-01 TO HOME OR SELFCARE





- Discharge Diagnoses


(1) Gastroenteritis


Status: Acute   Comment: Patient treated with anti-emetics and pain medicine.  

Discharged home within 24hrs.  Recommend not taking herbal tea over the counter.

 Avoid dairy products.    





Plan





- Discharge Medications


Prescriptions: 


HYDROcodone/APAP 5-325 [Norco 5-325 mg TAB] 1 each PO Q6HR PRN 10 Days #20 

tablet


 PRN Reason: Pain


Ondansetron HCl [Zofran] 4 mg PO Q8HR PRN 10 Days #30 tablet


 PRN Reason: Nausea





- Provider Discharge Summary


Additional instructions: 


[]  Smoking cessation referral if applicable(refer to patient education folder 

for contact #)


[]  Refer to [facility]'s Critical access hospital Center Booklet








Call your doctor immediately for:


* Fever > 100.5


* Heavy vaginal bleeding ( >1 pad per hour)


* Severe persistent headache


* Shortness of breath


* Reddened, hot, painful area to leg or breast


* Drainage or odor from incision.





* Keep incision clean and dry at all times and follow doctor's instructions 

regarding bathing/showering











- Follow up plan


Follow up: 


PRIMARY CARE,MD [Primary Care Provider] - 3-5 Days


TESSY QUINONES MD [Staff Physician] - 7 Days


Forms:  Accompanied Note, [facility] Discharge Summary

## 2020-02-27 NOTE — EVENT NOTE
S: Patient feels abdominal discomfort when getting out of bed. 


Ambulated hospital floor with patient without any difficulty.  


No vomiting.  She states she feels occasional nausea.  She has tolerated fluids.





O:


Afebrile. Vitals stable


GEN: NAD, AOX23


Abd: nontender





A


Gastroenteritis


Uterine leiomoyoma





P


Continue pain control. 


Anti-emetics for nausea


Discharge home today with PO pain/nausea medicine. 


Patient has appointment with new doctor at [facility] today. 


Patient will f/u in my office within one week or for worsening of symptoms.

## 2020-02-27 NOTE — PROGRESS NOTE
Assessment and Plan








Full Consult Dictated:





35 y/o female c/o lower abd pain.  CT reviewed with radiologist.   Enlarged 

uterus with surrounding inflammatory changes.  probable hydrosalpynx also.  

pelvic fluid.  r/o PID  r/o  TOA.  





ER physician also mentioned significant cervical tenderness on exam.





Abd - soft.  but generalized mild tenderness.  increased in lower abd and 

suprapubic area.





imp 





r/o PID


r/o  TOA





rec:





NPO


IVF


IV antibiotics (though wbc wnl)





observation





no general surgical intervention appears indicated at this time.











History of Present Illness


Date of examination: 20


Date of admission: 


20 15:13





Chief complaint: 





abdominal pain


History of present illness: 








33yo  presented to ER complaining of diffuse abdominal pain.  She states 

this weekend she drank green tea, had 6 bowel movements then experienced severe 

abdominal pain.  She ate pasta alfredro from [employer], cereal with milk and rice 

within 24 hours of taking the tea and states the excruciating pain began after 

that time.  She denies any vaginal bleeding, nausea and vomiting.  Her last 

sexual intercourse was in December which she attributes to her  being in 

[place]. She denies any history of sexually transmitted diseases.





Her US reveals uterine fibroids to which she was not aware of.  Non-specific 

pelvic exam,  WBC 4 and low grade fever is not suggestive of pelvic inflammatory

disease therefore antibiotics were discontinued. 











Past History


Family/Genetic History: none


Social history: 





- Obstetrical History


: 2


Number of Living Children: 1








                                Selected Entries











  20





  11:31


 


Temperature 99.2 F


 


Pulse Rate 80


 


Respiratory 16





Rate 


 


Blood Pressure 130/77








                                Laboratory Tests











  20





  08:18


 


WBC  4.8


 


Hgb  13.0


 


Hct  39.0

















Objective


                               Vital Signs - 12hr











  20





  04:48 04:49 05:26


 


Temperature 98.5 F  


 


Pulse Rate 77  


 


Respiratory 20  18





Rate   


 


Blood Pressure 106/45  


 


Blood Pressure  132/60 





[Right]   


 


O2 Sat by Pulse 98  





Oximetry   














  20





  07:25 11:31


 


Temperature 98.5 F 99.2 F


 


Pulse Rate 77 80


 


Respiratory 16 16





Rate  


 


Blood Pressure 117/62 130/77


 


Blood Pressure  





[Right]  


 


O2 Sat by Pulse 97 99





Oximetry  














- Labs





                                 20 08:18





                                 20 08:18

## 2020-02-27 NOTE — CONSULTATION
REASON FOR CONSULTATION:  Abdominal pain.



HISTORY OF PRESENT ILLNESS:  The patient is a pleasant 34-year-old female who

presented to the Emergency Room with a chief complaint of lower abdominal pain

accompanied by nausea, but no vomiting.  Denies any vaginal discharge.  Had some

fever recently.  Denies any dysuria.



PAST MEDICAL HISTORY:  Negative.



PAST SURGICAL HISTORY:  Negative.



ALLERGIES:  No known allergies.



MEDICATIONS:  No medications.



FAMILY HISTORY:  Hypertension and diabetes.



SOCIAL HISTORY:  Denies any smoking or drinking.



PHYSICAL EXAMINATION:

GENERAL:  At this time reveals the patient to be awake, alert, cooperative. 

States she is feeling " but still complaining of nonspecific

generalized lower abdominal pain.

VITAL SIGNS:  Show her to be running a low-grade temperature of 99.5, blood

pressure 130/77, pulse of 80, respirations 16.

HEENT:  Pupils are equal and reactive to light and accommodation.  Sclerae are

nonicteric.

ABDOMEN:  Examination of the abdomen reveals to be moderately obese.  There is

some mild generalized abdominal pain, but more so localized in the lower abdomen

as well as the suprapubic region.  Negative CVA tenderness.  Bowel sounds are

present.



LABORATORY DATA:  Lab work at present includes a CBC, which shows a white count

of 4.8, H and H is 13 and 39.  Electrolytes are essentially within normal

limits.  Lipase is normal at 20.  Pregnancy test is negative.  LFTs show normal

bilirubin of 0.3, AST is 33, ALT is 77.  The total creatine kinase is elevated

at 1043.  A CT scan of the abdomen and pelvis as well as a pelvic ultrasound

have been performed, which I have reviewed with the radiologist.  The CT shows

some inflammatory changes around the uterus as well as possible right

hydrosalpinx.  Some pelvic fluid is also noted.  This inflammatory process and

findings appear to be consistent with possible PID.  The transvaginal pelvic

ultrasound again confirms some mild echogenic pelvic fluid, uncertain etiology

as well as uterine fibroid.



IMPRESSION:  At this time is that of a healthy 34-year-old female with lower

abdominal pain and inflammation around the uterus and tubes.  Rule out PID, rule

out tubo-ovarian abscess.  I had spoken with the ER physician at the time of her

admission and he stated that the patient did elicit significant cervical

tenderness on pelvic exam.



RECOMMENDATIONS:  At this time would be to keep the patient n.p.o. at present

since she began experiencing nausea once she attempted to eat this morning.  I

would start her on IV fluids and keep her n.p.o.  We would also consider IV

antibiotics and close observation.  I will follow with you.



Thank you very much for consultation.





DD: 02/27/2020 14:56

DT: 02/27/2020 15:22

JOB# [id number]  [id number]

GIOVANNI/RALPH

## 2020-03-01 ENCOUNTER — HOSPITAL ENCOUNTER (EMERGENCY)
Dept: HOSPITAL 5 - ED | Age: 35
Discharge: HOME | End: 2020-03-01
Payer: COMMERCIAL

## 2020-03-01 VITALS — SYSTOLIC BLOOD PRESSURE: 133 MMHG | DIASTOLIC BLOOD PRESSURE: 84 MMHG

## 2020-03-01 DIAGNOSIS — N80.9: ICD-10-CM

## 2020-03-01 DIAGNOSIS — N39.0: Primary | ICD-10-CM

## 2020-03-01 DIAGNOSIS — Z79.899: ICD-10-CM

## 2020-03-01 DIAGNOSIS — M79.606: ICD-10-CM

## 2020-03-01 LAB
ALBUMIN SERPL-MCNC: 3.8 G/DL (ref 3.9–5)
ALT SERPL-CCNC: 15 UNITS/L (ref 7–56)
BACTERIA #/AREA URNS HPF: (no result) /HPF
BASOPHILS # (AUTO): 0.1 K/MM3 (ref 0–0.1)
BASOPHILS NFR BLD AUTO: 0.8 % (ref 0–1.8)
BILIRUB UR QL STRIP: (no result)
BLOOD UR QL VISUAL: (no result)
BUN SERPL-MCNC: 6 MG/DL (ref 7–17)
BUN/CREAT SERPL: 9 %
CALCIUM SERPL-MCNC: 9.5 MG/DL (ref 8.4–10.2)
EOSINOPHIL # BLD AUTO: 0 K/MM3 (ref 0–0.4)
EOSINOPHIL NFR BLD AUTO: 0.4 % (ref 0–4.3)
HCT VFR BLD CALC: 37.9 % (ref 30.3–42.9)
HEMOLYSIS INDEX: 73
HGB BLD-MCNC: 12.5 GM/DL (ref 10.1–14.3)
LYMPHOCYTES # BLD AUTO: 1.3 K/MM3 (ref 1.2–5.4)
LYMPHOCYTES NFR BLD AUTO: 16.6 % (ref 13.4–35)
MCHC RBC AUTO-ENTMCNC: 33 % (ref 30–34)
MCV RBC AUTO: 91 FL (ref 79–97)
MONOCYTES # (AUTO): 0.8 K/MM3 (ref 0–0.8)
MONOCYTES % (AUTO): 9.9 % (ref 0–7.3)
MUCOUS THREADS #/AREA URNS HPF: (no result) /HPF
PH UR STRIP: 6 [PH] (ref 5–7)
PLATELET # BLD: 385 K/MM3 (ref 140–440)
PROT UR STRIP-MCNC: (no result) MG/DL
RBC # BLD AUTO: 4.17 M/MM3 (ref 3.65–5.03)
RBC #/AREA URNS HPF: 165 /HPF (ref 0–6)
UROBILINOGEN UR-MCNC: < 2 MG/DL (ref ?–2)
WBC #/AREA URNS HPF: 20 /HPF (ref 0–6)

## 2020-03-01 PROCEDURE — 99284 EMERGENCY DEPT VISIT MOD MDM: CPT

## 2020-03-01 PROCEDURE — 80053 COMPREHEN METABOLIC PANEL: CPT

## 2020-03-01 PROCEDURE — 81001 URINALYSIS AUTO W/SCOPE: CPT

## 2020-03-01 PROCEDURE — 81025 URINE PREGNANCY TEST: CPT

## 2020-03-01 PROCEDURE — 96372 THER/PROPH/DIAG INJ SC/IM: CPT

## 2020-03-01 PROCEDURE — 87086 URINE CULTURE/COLONY COUNT: CPT

## 2020-03-01 PROCEDURE — 85025 COMPLETE CBC W/AUTO DIFF WBC: CPT

## 2020-03-01 PROCEDURE — 36415 COLL VENOUS BLD VENIPUNCTURE: CPT

## 2020-03-01 PROCEDURE — 83690 ASSAY OF LIPASE: CPT

## 2020-03-01 PROCEDURE — 76830 TRANSVAGINAL US NON-OB: CPT

## 2020-03-01 NOTE — EVENT NOTE
ED Screening Note


Date of service: 03/01/20


Time: 18:49


ED Screening Note: 





Pt complains of elevated BP and diffuse muscle spasms 


home /130


recently started on nifedipine for BP


HR in 130s-denies SP or SOB


+right calf pain 


muscle spasms are chronic per pt 





This initial assessment/diagnostic orders/clinical plan/treatment(s) is/are 

subject to change based on patients health status, clinical progression and re-

assessment by fellow clinical providers in the ED. Further treatment and workup 

at subsequent clinical providers discretion. Patient/guardian urged not to elope

from the ED as their condition may be serious if not clinically assessed and 

managed. 





Initial orders include: 


labs


US

## 2020-03-01 NOTE — EMERGENCY DEPARTMENT REPORT
ED Abdominal Pain HPI





- General


Chief Complaint: Abdominal Pain


Stated Complaint: LOWER ABD PAIN  LEG PAIN CONSTIPATION


Time Seen by Provider: 20 19:53


Source: patient


Mode of arrival: Ambulatory


Limitations: No Limitations





- History of Present Illness


Initial Comments: 





Patient is a 34-year-old F American female who states she has had abdominal pain

for approximately a week.  Patient was admitted from the  for presumed 

PID secondary to the patient having pelvic pain on physical exam however 

ultrasound showed she had some inflammation around the uterus however her wet 

prep showed very few white cells and her white count was normal making PID less 

likely according to the OB/GYN.  Patient was discharged with a diagnosis of 

gastroenteritis.  Patient is to follow-up with her primary care physician or 

OB/GYN.  Patient states she is continued to have lower abdominal pain.  She 

states initially was diffuse however is now suprapubic only.  She has had some 

constipation for the last several days.  Although the patient states when she we

nt to ultrasound today she did have a bowel movement.  Patient denies dysuria 

urinary frequency.  She does have some vaginal bleeding but she is on her menses

currently.


Severity scale (0 -10): 7





- Related Data


                                  Previous Rx's











 Medication  Instructions  Recorded  Last Taken  Type


 


HYDROcodone/APAP 5-325 [[place] 1 each PO Q6HR PRN #14 tablet 14 Unknown Rx





5/325]    


 


Butalb/Acetamin/Caff -40 1 tab PO Q6HR PRN #12 tab 18 Unknown Rx





[Fioricet]    


 


HYDROcodone/APAP 5-325 [[place] 1 each PO Q6HR PRN 10 Days #20 20 Unknown Rx





5-325 mg TAB] tablet   


 


Ondansetron HCl [Zofran] 4 mg PO Q8HR PRN 10 Days #30 tablet 20 Unknown Rx


 


Ibuprofen [Motrin 800 MG tab] 800 mg PO Q8HR PRN #14 tablet 20 Unknown Rx


 


Nitrofurantoin Mono/M-Cryst 100 mg PO Q12HR #14 capsule 20 Unknown Rx





[Macrobid CAP]    


 


medroxyPROGESTERone ACETATE 5 mg PO QDAY #7 tablet 20 Unknown Rx





[Provera]    


 


traMADoL [Ultram] 50 mg PO Q6HR PRN #12 tablet 20 Unknown Rx











                                    Allergies











Allergy/AdvReac Type Severity Reaction Status Date / Time


 


No Known Allergies Allergy   Verified 14 04:07














ED Review of Systems


ROS: 


Stated complaint: LOWER ABD PAIN  LEG PAIN CONSTIPATION


Other details as noted in HPI





Comment: All other systems reviewed and negative





ED Past Medical Hx





- Past Medical History


Previous Medical History?: No





- Surgical History


Past Surgical History?: No





- Social History


Smoking Status: Never Smoker


Substance Use Type: None





- Medications


Home Medications: 


                                Home Medications











 Medication  Instructions  Recorded  Confirmed  Last Taken  Type


 


HYDROcodone/APAP 5-325 [[place] 1 each PO Q6HR PRN #14 tablet 14  Unknown Rx





5/325]     


 


Butalb/Acetamin/Caff -40 1 tab PO Q6HR PRN #12 tab 18  Unknown Rx





[Fioricet]     


 


HYDROcodone/APAP 5-325 [[place] 1 each PO Q6HR PRN 10 Days #20 20  Unknown 

Rx





5-325 mg TAB] tablet    


 


Ondansetron HCl [Zofran] 4 mg PO Q8HR PRN 10 Days #30 tablet 20  Unknown 

Rx


 


Ibuprofen [Motrin 800 MG tab] 800 mg PO Q8HR PRN #14 tablet 20  Unknown Rx


 


Nitrofurantoin Mono/M-Cryst 100 mg PO Q12HR #14 capsule 20  Unknown Rx





[Macrobid CAP]     


 


medroxyPROGESTERone ACETATE 5 mg PO QDAY #7 tablet 20  Unknown Rx





[Provera]     


 


traMADoL [Ultram] 50 mg PO Q6HR PRN #12 tablet 20  Unknown Rx














ED Physical Exam





- General


Limitations: No Limitations


General appearance: alert, in no apparent distress





- Head


Head exam: Present: atraumatic, normocephalic





- Eye


Eye exam: Present: normal appearance, PERRL, EOMI





- ENT


ENT exam: Present: mucous membranes moist





- Neck


Neck exam: Present: normal inspection





- Respiratory


Respiratory exam: Present: normal lung sounds bilaterally.  Absent: respiratory 

distress, wheezes, rales, rhonchi





- Cardiovascular


Cardiovascular Exam: Present: regular rate, normal rhythm, normal heart sounds. 

 Absent: systolic murmur, diastolic murmur, rubs, gallop





- GI/Abdominal


GI/Abdominal exam: Present: soft, tenderness (suprapubic), normal bowel sounds. 

 Absent: distended, guarding, rebound, rigid





- Extremities Exam


Extremities exam: Present: normal inspection





- Back Exam


Back exam: Present: normal inspection





- Neurological Exam


Neurological exam: Present: alert, oriented X3





- Psychiatric


Psychiatric exam: Present: normal affect, normal mood





- Skin


Skin exam: Present: warm, dry, intact, normal color.  Absent: rash





ED Course


                                   Vital Signs











  20





  18:56 20:15 21:00


 


Temperature 99.1 F  


 


Pulse Rate 97 H  102 H


 


Respiratory 18 18 18





Rate   


 


Blood Pressure 112/52  


 


Blood Pressure   138/77





[Left]   


 


O2 Sat by Pulse 100  100





Oximetry   














ED Medical Decision Making





- Lab Data


Result diagrams: 


                                 20 19:06





                                 20 19:06








                                   Lab Results











  20 Range/Units





  19:06 19:06 19:06 


 


WBC  7.9    (4.5-11.0)  K/mm3


 


RBC  4.17    (3.65-5.03)  M/mm3


 


Hgb  12.5    (10.1-14.3)  gm/dl


 


Hct  37.9    (30.3-42.9)  %


 


MCV  91    (79-97)  fl


 


MCH  30    (28-32)  pg


 


MCHC  33    (30-34)  %


 


RDW  13.8    (13.2-15.2)  %


 


Plt Count  385    (140-440)  K/mm3


 


Lymph % (Auto)  16.6    (13.4-35.0)  %


 


Mono % (Auto)  9.9 H    (0.0-7.3)  %


 


Eos % (Auto)  0.4    (0.0-4.3)  %


 


Baso % (Auto)  0.8    (0.0-1.8)  %


 


Lymph #  1.3    (1.2-5.4)  K/mm3


 


Mono #  0.8    (0.0-0.8)  K/mm3


 


Eos #  0.0    (0.0-0.4)  K/mm3


 


Baso #  0.1    (0.0-0.1)  K/mm3


 


Seg Neutrophils %  72.3 H    (40.0-70.0)  %


 


Seg Neutrophils #  5.7    (1.8-7.7)  K/mm3


 


Sodium   134 L   (137-145)  mmol/L


 


Potassium   4.4  D   (3.6-5.0)  mmol/L


 


Chloride   97.7 L   ()  mmol/L


 


Carbon Dioxide   22   (22-30)  mmol/L


 


Anion Gap   19   mmol/L


 


BUN   6 L   (7-17)  mg/dL


 


Creatinine   0.7   (0.7-1.2)  mg/dL


 


Estimated GFR   > 60   ml/min


 


BUN/Creatinine Ratio   9   %


 


Glucose   102 H   ()  mg/dL


 


Calcium   9.5   (8.4-10.2)  mg/dL


 


Total Bilirubin   0.40   (0.1-1.2)  mg/dL


 


AST   23   (5-40)  units/L


 


ALT   15   (7-56)  units/L


 


Alkaline Phosphatase   71   ()  units/L


 


Total Protein   8.2   (6.3-8.2)  g/dL


 


Albumin   3.8 L   (3.9-5)  g/dL


 


Albumin/Globulin Ratio   0.9   %


 


Lipase    28  (13-60)  units/L


 


Urine Color     (Yellow)  


 


Urine Turbidity     (Clear)  


 


Urine pH     (5.0-7.0)  


 


Ur Specific Gravity     (1.003-1.030)  


 


Urine Protein     (Negative)  mg/dL


 


Urine Glucose (UA)     (Negative)  mg/dL


 


Urine Ketones     (Negative)  mg/dL


 


Urine Blood     (Negative)  


 


Urine Nitrite     (Negative)  


 


Ur Reducing Substances     


 


Urine Bilirubin     (Negative)  


 


Urine Ictotest     


 


Urine Urobilinogen     (<2.0)  mg/dL


 


Ur Leukocyte Esterase     (Negative)  


 


Urine WBC (Auto)     (0.0-6.0)  /HPF


 


Urine RBC (Auto)     (0.0-6.0)  /HPF


 


U Epithel Cells (Auto)     (0-13.0)  /HPF


 


Urine Bacteria (Auto)     (Negative)  /HPF


 


Urine Mucus     /HPF


 


Urine HCG, Qual     (Negative)  














  20 Range/Units





  21:36 


 


WBC   (4.5-11.0)  K/mm3


 


RBC   (3.65-5.03)  M/mm3


 


Hgb   (10.1-14.3)  gm/dl


 


Hct   (30.3-42.9)  %


 


MCV   (79-97)  fl


 


MCH   (28-32)  pg


 


MCHC   (30-34)  %


 


RDW   (13.2-15.2)  %


 


Plt Count   (140-440)  K/mm3


 


Lymph % (Auto)   (13.4-35.0)  %


 


Mono % (Auto)   (0.0-7.3)  %


 


Eos % (Auto)   (0.0-4.3)  %


 


Baso % (Auto)   (0.0-1.8)  %


 


Lymph #   (1.2-5.4)  K/mm3


 


Mono #   (0.0-0.8)  K/mm3


 


Eos #   (0.0-0.4)  K/mm3


 


Baso #   (0.0-0.1)  K/mm3


 


Seg Neutrophils %   (40.0-70.0)  %


 


Seg Neutrophils #   (1.8-7.7)  K/mm3


 


Sodium   (137-145)  mmol/L


 


Potassium   (3.6-5.0)  mmol/L


 


Chloride   ()  mmol/L


 


Carbon Dioxide   (22-30)  mmol/L


 


Anion Gap   mmol/L


 


BUN   (7-17)  mg/dL


 


Creatinine   (0.7-1.2)  mg/dL


 


Estimated GFR   ml/min


 


BUN/Creatinine Ratio   %


 


Glucose   ()  mg/dL


 


Calcium   (8.4-10.2)  mg/dL


 


Total Bilirubin   (0.1-1.2)  mg/dL


 


AST   (5-40)  units/L


 


ALT   (7-56)  units/L


 


Alkaline Phosphatase   ()  units/L


 


Total Protein   (6.3-8.2)  g/dL


 


Albumin   (3.9-5)  g/dL


 


Albumin/Globulin Ratio   %


 


Lipase   (13-60)  units/L


 


Urine Color  Yellow  (Yellow)  


 


Urine Turbidity  Slightly-cloudy  (Clear)  


 


Urine pH  6.0  (5.0-7.0)  


 


Ur Specific Gravity  1.012  (1.003-1.030)  


 


Urine Protein  <15 mg/dl  (Negative)  mg/dL


 


Urine Glucose (UA)  Neg  (Negative)  mg/dL


 


Urine Ketones  Neg  (Negative)  mg/dL


 


Urine Blood  Lg  (Negative)  


 


Urine Nitrite  Neg  (Negative)  


 


Ur Reducing Substances  Not Reportable  


 


Urine Bilirubin  Neg  (Negative)  


 


Urine Ictotest  Not Reportable  


 


Urine Urobilinogen  < 2.0  (<2.0)  mg/dL


 


Ur Leukocyte Esterase  Mod  (Negative)  


 


Urine WBC (Auto)  20.0 H  (0.0-6.0)  /HPF


 


Urine RBC (Auto)  165.0  (0.0-6.0)  /HPF


 


U Epithel Cells (Auto)  5.0  (0-13.0)  /HPF


 


Urine Bacteria (Auto)  1+  (Negative)  /HPF


 


Urine Mucus  Few  /HPF


 


Urine HCG, Qual  Negative  (Negative)  














- Radiology Data





[facility] 


[address] [place] [address] 





Ultrasound Report 


Signed 





 Patient: TEMI BRIGHT MR#: [id number] 


 1 


 : 1985 Acct:[id number] 





 Age/Sex: 34 / F ADM Date: 20 





 Loc: ED 


 Attending Dr: 








 Ordering Physician: IRENE MONTGOMERY 


 Date of Service: 20 


 Procedure(s): US transvaginal 


 Accession Number(s): [id number] 





 cc: IRENE MONTGOMERY 








 EXAMINATION: Nonobstetrical complete pelvic ultrasound 





CLINICAL INFORMATION: Pelvic pain. 





COMPARISON: CT of the abdomen and pelvis, 2020. Pelvic ultrasound, 

2020 





FINDINGS: 





The uterus is upper limits of normal in size measuring 8.9 x 5.4 x 6.1 cm. Again

 a diffusely 


 heterogeneous appearance of the uterus is again noted, similar to the previous 

study. The endome


 trium is mildly thickened to a maximum thickness of 1.2 cm. 





The right adnexal region demonstrates no definitive abnormality. Doppler flow is

 demonstrated to 


 the right adnexal region. The left adnexa is not well-visualized. 





A moderate amount of complex free pelvic fluid is again noted. 





IMPRESSION: 


1. Moderate amount of complex free fluid which overall appears similar to the 

previous ultrasound. 


 Considerations would include hemoperitoneum as previously described. 


2. Mildly enlarged heterogeneous uterus which may be secondary to the presence 

of multiple uterine 


 fibroids. 





Signer Name: Delores Espinoza MD 


Signed: 3/1/2020 9:16 PM 


Workstation Name: [id number] 








 Transcribed By: NASRA 


 Dictated By: Delores Espinoza MD 


 Electronically Authenticated By: Delores Espinoza MD 


 Signed Date/Time: 20 





- Medical Decision Making





Patient is a 34-year-old F American female who now is having just suprapubic 

pain although her urine does show signs of a urinary tract infection the patient

 also likely has endometriosis.  She is continued to have what appears to be 

blood in the lower abdominal pelvis On her ultrasounds.  Patient continues to 

have a normal white blood cell count.  Patient to be started on Macrobid for the

 UTI as well as Provera she will be discharged to follow-up with her OB/GYN.


Critical care attestation.: 


If time is entered above; I have spent that time in minutes in the direct care 

of this critically ill patient, excluding procedure time.








ED Disposition


Clinical Impression: 


 UTI (urinary tract infection), Endometriosis





Disposition:  TO HOME OR SELFCARE


Is pt being admited?: No


Does the pt Need Aspirin: No


Condition: Stable


Instructions:  Abdominal Pain (ED)


Additional Instructions: 


You have continued to have some free fluid in the pelvis that appears to be 

blood.  Please have your OB/GYN evaluate you for admit endometriosis


Referrals: 


TESSY QUINONES MD [Staff Physician] - 3-5 Days


Time of Disposition: 23:06

## 2020-03-01 NOTE — ULTRASOUND REPORT
EXAMINATION: Nonobstetrical complete pelvic ultrasound



CLINICAL INFORMATION: Pelvic pain.



COMPARISON: CT of the abdomen and pelvis, 2/26/2020. Pelvic ultrasound, 2/26/2020



FINDINGS: 



The uterus is upper limits of normal in size measuring 8.9 x 5.4 x 6.1 cm. Again a diffusely heteroge
neous appearance of the uterus is again noted, similar to the previous study. The endometrium is mild
ly thickened to a maximum thickness of 1.2 cm.



The right adnexal region demonstrates no definitive abnormality. Doppler flow is demonstrated to the 
right adnexal region. The left adnexa is not well-visualized.



A moderate amount of complex free pelvic fluid is again noted.



IMPRESSION: 

1. Moderate amount of complex free fluid which overall appears similar to the previous ultrasound. Co
nsiderations would include hemoperitoneum as previously described.

2. Mildly enlarged heterogeneous uterus which may be secondary to the presence of multiple uterine fi
broids.



Signer Name: Delores Espinoza MD 

Signed: 3/1/2020 9:16 PM

Workstation Name: [id number]

## 2021-12-10 ENCOUNTER — HOSPITAL ENCOUNTER (EMERGENCY)
Dept: HOSPITAL 5 - ED | Age: 36
Discharge: HOME | End: 2021-12-10
Payer: COMMERCIAL

## 2021-12-10 VITALS — DIASTOLIC BLOOD PRESSURE: 70 MMHG | SYSTOLIC BLOOD PRESSURE: 114 MMHG

## 2021-12-10 DIAGNOSIS — G43.009: Primary | ICD-10-CM

## 2021-12-10 LAB
ALBUMIN SERPL-MCNC: 3.8 G/DL (ref 3.9–5)
ALT SERPL-CCNC: 28 UNITS/L (ref 7–56)
BASOPHILS # (AUTO): 0 K/MM3 (ref 0–0.1)
BASOPHILS NFR BLD AUTO: 0.7 % (ref 0–1.8)
BUN SERPL-MCNC: 13 MG/DL (ref 7–17)
BUN/CREAT SERPL: 16 %
CALCIUM SERPL-MCNC: 8.4 MG/DL (ref 8.4–10.2)
EOSINOPHIL # BLD AUTO: 0.1 K/MM3 (ref 0–0.4)
EOSINOPHIL NFR BLD AUTO: 1 % (ref 0–4.3)
HCT VFR BLD CALC: 39 % (ref 30.3–42.9)
HEMOLYSIS INDEX: 4
HGB BLD-MCNC: 12.6 GM/DL (ref 10.1–14.3)
LYMPHOCYTES # BLD AUTO: 1.4 K/MM3 (ref 1.2–5.4)
LYMPHOCYTES NFR BLD AUTO: 25.5 % (ref 13.4–35)
MCHC RBC AUTO-ENTMCNC: 32 % (ref 30–34)
MCV RBC AUTO: 93 FL (ref 79–97)
MONOCYTES # (AUTO): 0.6 K/MM3 (ref 0–0.8)
MONOCYTES % (AUTO): 10.5 % (ref 0–7.3)
PLATELET # BLD: 325 K/MM3 (ref 140–440)
RBC # BLD AUTO: 4.19 M/MM3 (ref 3.65–5.03)

## 2021-12-10 PROCEDURE — 80053 COMPREHEN METABOLIC PANEL: CPT

## 2021-12-10 PROCEDURE — 84703 CHORIONIC GONADOTROPIN ASSAY: CPT

## 2021-12-10 PROCEDURE — 93005 ELECTROCARDIOGRAM TRACING: CPT

## 2021-12-10 PROCEDURE — 99284 EMERGENCY DEPT VISIT MOD MDM: CPT

## 2021-12-10 PROCEDURE — 70450 CT HEAD/BRAIN W/O DYE: CPT

## 2021-12-10 PROCEDURE — 85025 COMPLETE CBC W/AUTO DIFF WBC: CPT

## 2021-12-10 PROCEDURE — 36415 COLL VENOUS BLD VENIPUNCTURE: CPT

## 2021-12-10 NOTE — ELECTROCARDIOGRAPH REPORT
[facility]

                                       

Test Date:    2021-12-10               Test Time:    02:37:47

Pat Name:     TEMI BRIGHT            Department:   

Patient ID:   [id number]          Room:          

Gender:       F                        Technician:   NURSE

:          1985               Requested By: AARON VIGIL

Order Number: [id number]              Reading MD:   Josefa Nascimento

                                 Measurements

Intervals                              Axis          

Rate:         74                       P:            69

MI:           156                      QRS:          57

QRSD:         88                       T:            21

QT:           396                                    

QTc:          442                                    

                           Interpretive Statements

Sinus rhythm

Left atrial enlargement

No previous ECG available for comparison

Electronically Signed On 12- 12:59:34 EST by Josefa Nascimento

## 2021-12-10 NOTE — CAT SCAN REPORT
CT HEAD WITHOUT CONTRAST



INDICATION / CLINICAL INFORMATION: Lightheadedness, dizziness, weakness, LEFT hand shaking.



TECHNIQUE: All CT scans at this location are performed using CT dose reduction for ALARA by means of 
automated exposure control. 



COMPARISON: None available.



FINDINGS:

BRAIN PARENCHYMA: No acute intracranial hemorrhage. No evidence of recent infarct. No mass effect or 
midline shift.

VENTRICULAR SYSTEM/EXTRA-AXIAL SPACES: Ventricles are normal for age. No extra-axial fluid collection
. 

ORBITS: Normal as visualized.

SKELETAL SYSTEM/SOFT TISSUES: Normal bones and soft tissues.

PARANASAL SINUSES/MASTOID AIR CELLS: No significant abnormality.



ADDITIONAL FINDINGS: None.



IMPRESSION:

1. No acute intracranial abnormality.



Signer Name: Maciel Neri MD 

Signed: 12/10/2021 3:48 AM

Workstation Name: [id number]
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
## 2022-02-21 ENCOUNTER — HOSPITAL ENCOUNTER (EMERGENCY)
Dept: HOSPITAL 5 - ED | Age: 37
Discharge: HOME | End: 2022-02-21
Payer: COMMERCIAL

## 2022-02-21 VITALS — DIASTOLIC BLOOD PRESSURE: 78 MMHG | SYSTOLIC BLOOD PRESSURE: 137 MMHG

## 2022-02-21 DIAGNOSIS — R10.9: ICD-10-CM

## 2022-02-21 DIAGNOSIS — G44.209: ICD-10-CM

## 2022-02-21 DIAGNOSIS — O26.892: Primary | ICD-10-CM

## 2022-02-21 DIAGNOSIS — Z3A.14: ICD-10-CM

## 2022-02-21 LAB
BASOPHILS # (AUTO): 0 K/MM3 (ref 0–0.1)
BASOPHILS NFR BLD AUTO: 0.9 % (ref 0–1.8)
BUN SERPL-MCNC: 5 MG/DL (ref 7–17)
BUN/CREAT SERPL: 10 %
CALCIUM SERPL-MCNC: 9.2 MG/DL (ref 8.4–10.2)
EOSINOPHIL # BLD AUTO: 0 K/MM3 (ref 0–0.4)
EOSINOPHIL NFR BLD AUTO: 0.7 % (ref 0–4.3)
HCT VFR BLD CALC: 38.2 % (ref 30.3–42.9)
HEMOLYSIS INDEX: 37
HGB BLD-MCNC: 12.5 GM/DL (ref 10.1–14.3)
LYMPHOCYTES # BLD AUTO: 1.4 K/MM3 (ref 1.2–5.4)
LYMPHOCYTES NFR BLD AUTO: 27.3 % (ref 13.4–35)
MCHC RBC AUTO-ENTMCNC: 33 % (ref 30–34)
MCV RBC AUTO: 91 FL (ref 79–97)
MONOCYTES # (AUTO): 0.4 K/MM3 (ref 0–0.8)
MONOCYTES % (AUTO): 8.4 % (ref 0–7.3)
PLATELET # BLD: 335 K/MM3 (ref 140–440)
RBC # BLD AUTO: 4.18 M/MM3 (ref 3.65–5.03)

## 2022-02-21 PROCEDURE — 99284 EMERGENCY DEPT VISIT MOD MDM: CPT

## 2022-02-21 PROCEDURE — 76801 OB US < 14 WKS SINGLE FETUS: CPT

## 2022-02-21 PROCEDURE — 80048 BASIC METABOLIC PNL TOTAL CA: CPT

## 2022-02-21 PROCEDURE — 84702 CHORIONIC GONADOTROPIN TEST: CPT

## 2022-02-21 PROCEDURE — 85025 COMPLETE CBC W/AUTO DIFF WBC: CPT

## 2022-02-21 PROCEDURE — 36415 COLL VENOUS BLD VENIPUNCTURE: CPT

## 2022-02-21 NOTE — ULTRASOUND REPORT
OBSTETRIC ULTRASOUND



INDICATION: 

pelvic pain pregnancy



COMPARISON:

No prior relevant imaging studies are available for comparison.



TECHNIQUE:

Transabdominal imaging was performed.



FINDINGS:

Single viable intrauterine pregnancy is identified. 



Fetal lie:  Cephalic. 

Heart rate:  162 bpm. 



Fetal measurements are as follows:

Biparietal diameter 2.4 cm, 14 weeks 0 days

Head circumference 9.2 cm, 14 weeks 1 day

Abdominal circumference 7.6 cm, 14 weeks 0 days

Femur length 1.3 cm, 13 weeks 6 days



There is a grade 0 anterior, low-lying placenta. Subjectively, amniotic fluid appears decreased.



There is mild heterogeneity lower endometrium which is nonspecific.



CONCLUSION:

1. Single viable intrauterine pregnancy currently in cephalic position with sonographic gestational a
ge of 14 weeks 0 days.

2. Subjectively, amniotic fluid appears decreased. Short-term sonographic follow-up is recommended in
 approximately 6 weeks.

3. Anterior placenta appears somewhat low lying. This could also be readdressed in approximately 6 we
eks.

4. Mild heterogeneity in the lower uterine segment endometrium is nonspecific and could be related to
 technical factors.



Signer Name: Russel Escalera MD 

Signed: 2/21/2022 1:52 PM

Workstation Name: [email or web address]

## 2022-02-21 NOTE — EMERGENCY DEPARTMENT REPORT
ED Female  HPI





- General


Chief complaint: Dizziness


Stated complaint: HEADACHE/DIZZY/ABD PAIN


Time Seen by Provider: 22 11:18


Source: patient


Mode of arrival: Ambulatory


Limitations: No Limitations





- History of Present Illness


Initial comments: 





Chief complaint: Abdominal pain





HPI: This 36-year-old female with history of uterine fibroid  who 

presents with lower pelvic pain for several days.  No vaginal bleeding.  Her LMP

. In December her physician diagnosed her with pregnancy.  Moderate in

severity.  Gradual onset.  She also has mild left-sided headache.


MD Complaint: pelvic pain


-: Gradual, days(s) (Several days)


Severity: moderate


Severity scale (0 -10): 7


Quality: cramping


Consistency: constant


Improves with: none


Worsens with: none


Are you Pregnant Now?: Yes


Last Menstrual Period: 21


EDC: 22


Associated Symptoms: headaches





- Related Data


                                  Previous Rx's











 Medication  Instructions  Recorded  Last Taken  Type


 


HYDROcodone/APAP 5-325 [[place] 1 each PO Q6HR PRN #14 tablet 14 Unknown Rx





5/325]    


 


Butalb/Acetamin/Caff -40 1 tab PO Q6HR PRN #12 tab 18 Unknown Rx





[Fioricet]    


 


HYDROcodone/APAP 5-325 [[place] 1 each PO Q6HR PRN 10 Days #20 20 Unknown Rx





5-325 mg TAB] tablet   


 


ondansetron HCL [Zofran] 4 mg PO Q8HR PRN 10 Days #30 tablet 20 Unknown Rx


 


Ibuprofen [Motrin 800 MG tab] 800 mg PO Q8HR PRN #14 tablet 20 Unknown Rx


 


Nitrofurantoin Mono/M-Cryst 100 mg PO Q12HR #14 capsule 20 Unknown Rx





[Macrobid CAP]    


 


medroxyPROGESTERone ACETATE 5 mg PO QDAY #7 tablet 20 Unknown Rx





[Provera]    


 


traMADoL [Ultram] 50 mg PO Q6HR PRN #12 tablet 20 Unknown Rx


 


Ondansetron [Zofran Odt] 4 mg PO Q8HR PRN #20 tab.rapdis 22 Unknown Rx











                                    Allergies











Allergy/AdvReac Type Severity Reaction Status Date / Time


 


No Known Allergies Allergy   Verified 14 04:07














ED Review of Systems


ROS: 


Stated complaint: HEADACHE/DIZZY/ABD PAIN


Other details as noted in HPI





Comment: All other systems reviewed and negative


Constitutional: denies: chills, fever, malaise


Respiratory: denies: cough, shortness of breath


Cardiovascular: denies: chest pain


Gastrointestinal: abdominal pain.  denies: nausea, vomiting


Neurological: headache





ED Past Medical Hx





- Past Medical History


Previous Medical History?: Yes


Additional medical history: Uterine fibroid





- Surgical History


Past Surgical History?: No





- Social History


Smoking Status: Never Smoker


Substance Use Type: None





- Medications


Home Medications: 


                                Home Medications











 Medication  Instructions  Recorded  Confirmed  Last Taken  Type


 


HYDROcodone/APAP 5-325 [[place] 1 each PO Q6HR PRN #14 tablet 14  Unknown Rx





5/325]     


 


Butalb/Acetamin/Caff -40 1 tab PO Q6HR PRN #12 tab 18  Unknown Rx





[Fioricet]     


 


HYDROcodone/APAP 5-325 [[place] 1 each PO Q6HR PRN 10 Days #20 20  Unknown 

Rx





5-325 mg TAB] tablet    


 


ondansetron HCL [Zofran] 4 mg PO Q8HR PRN 10 Days #30 tablet 20  Unknown 

Rx


 


Ibuprofen [Motrin 800 MG tab] 800 mg PO Q8HR PRN #14 tablet 20  Unknown Rx


 


Nitrofurantoin Mono/M-Cryst 100 mg PO Q12HR #14 capsule 20  Unknown Rx





[Macrobid CAP]     


 


medroxyPROGESTERone ACETATE 5 mg PO QDAY #7 tablet 20  Unknown Rx





[Provera]     


 


traMADoL [Ultram] 50 mg PO Q6HR PRN #12 tablet 20  Unknown Rx


 


Ondansetron [Zofran Odt] 4 mg PO Q8HR PRN #20 tab.rapdis 22  Unknown Rx














ED Physical Exam





- General


Limitations: No Limitations


General appearance: alert, in no apparent distress





- Head


Head exam: Present: atraumatic, normocephalic





- Eye


Eye exam: Present: normal appearance





- ENT


ENT exam: Present: mucous membranes moist





- Neck


Neck exam: Present: normal inspection, full ROM





- Respiratory


Respiratory exam: Present: normal lung sounds bilaterally.  Absent: respiratory 

distress, wheezes, rales, rhonchi





- Cardiovascular


Cardiovascular Exam: Present: regular rate, normal rhythm, normal heart sounds. 

Absent: systolic murmur, diastolic murmur, rubs, gallop





- GI/Abdominal


GI/Abdominal exam: Present: soft, normal bowel sounds.  Absent: distended, 

tenderness, guarding, rebound





- Extremities Exam


Extremities exam: Present: normal inspection





- Neurological Exam


Neurological exam: Present: alert, oriented X3





- Psychiatric


Psychiatric exam: Present: normal affect, normal mood





- Skin


Skin exam: Present: warm, dry, intact, normal color.  Absent: rash





ED Course


                                   Vital Signs











  22





  11:07


 


Temperature 98 F


 


Pulse Rate 76


 


Respiratory 16





Rate 


 


Blood Pressure 137/78





[Left] 


 


O2 Sat by Pulse 96





Oximetry 














ED Medical Decision Making





- Lab Data


Result diagrams: 


                                 22 11:32





                                 22 11:32





- Radiology Data


Radiology results: report reviewed





Patient Name: TEMI BRIGHT


Patient ID: [id number]


Gender: Female


YOB: 1985


Home Phone: [phone]


Referring Provider: AARON VIGIL


Organization: [facility]


Accession Number: [id number]


Requested Date: 2022 11:26


Report Status: Final


Requested Procedure: 1


Procedure Description: US OB <= 14 weeks fetus


Modality: US


Findings


Reporting MD: Russel Escalera


Dictation Time: 2022 12:52


: Not available


Transcription Date:


OBSTETRIC ULTRASOUND 


INDICATION: 


pelvic pain pregnancy 


COMPARISON: 


No prior relevant imaging studies are available for comparison. 


TECHNIQUE: 


Transabdominal imaging was performed. 


FINDINGS: 


Single viable intrauterine pregnancy is identified. 


Fetal lie: Cephalic. 


Heart rate: 162 bpm. 


Fetal measurements are as follows: 


Biparietal diameter 2.4 cm, 14 weeks 0 days 


Head circumference 9.2 cm, 14 weeks 1 day 


Abdominal circumference 7.6 cm, 14 weeks 0 days 


Femur length 1.3 cm, 13 weeks 6 days 


There is a grade 0 anterior, low-lying placenta. Subjectively, amniotic fluid 

appears decreased. 


There is mild heterogeneity lower endometrium which is nonspecific. 


CONCLUSION: 


1. Single viable intrauterine pregnancy currently in cephalic position with 

sonographic gestational age of 14 weeks 0 days. 


2. Subjectively, amniotic fluid appears decreased. Short-term sonographic 

follow-up is recommended in approximately 6 weeks. 


3. Anterior placenta appears somewhat low lying. This could also be readdressed 

in approximately 6 weeks. 


4. Mild heterogeneity in the lower uterine segment endometrium is nonspecific 

and could be related to technical factors. 


Signer Name: Russel Escalera MD 


Signed: 2022 12:52 PM 


Workstation Name: [email or web address]





- Medical Decision Making





1.  Abdominal pain in pregnancy without indication of ectopic or appendicitis.  

Patient given reassurance.  Suspect mild ligament pain of pregnancy.





2.  Tension headache: No red flags such as sudden onset, significant severity, 

neurological findings.  Recommended Tylenol.








Prescribe Zofran for morning sickness.


Critical care attestation.: 


If time is entered above; I have spent that time in minutes in the direct care 

of this critically ill patient, excluding procedure time.








ED Disposition


Clinical Impression: 


 Abdominal pain affecting pregnancy, Round ligament pain, Tension headache





Disposition: 01 HOME / SELF CARE / HOMELESS


Is pt being admited?: No


Does the pt Need Aspirin: No


Condition: Stable


Instructions:  Round Ligament Pain, Abdominal Pain During Pregnancy, 

Easy-to-Read


Prescriptions: 


Ondansetron [Zofran Odt] 4 mg PO Q8HR PRN #20 tab.rapdis


 PRN Reason: Nausea


Referrals: 


PRIMARY CARE,MD [Primary Care Provider] - 3-5 Days


DIANNE GONZALEZ MD [Staff Physician] - 3-5 Days